# Patient Record
Sex: FEMALE | Race: WHITE | NOT HISPANIC OR LATINO | Employment: OTHER | URBAN - METROPOLITAN AREA
[De-identification: names, ages, dates, MRNs, and addresses within clinical notes are randomized per-mention and may not be internally consistent; named-entity substitution may affect disease eponyms.]

---

## 2021-01-15 DIAGNOSIS — Z23 NEED FOR VACCINATION: ICD-10-CM

## 2021-01-22 ENCOUNTER — IMMUNIZATION (OUTPATIENT)
Dept: FAMILY PLANNING/WOMEN'S HEALTH CLINIC | Facility: IMMUNIZATION CENTER | Age: 76
End: 2021-01-22
Attending: INTERNAL MEDICINE
Payer: MEDICARE

## 2021-01-22 DIAGNOSIS — Z23 NEED FOR VACCINATION: ICD-10-CM

## 2021-01-22 DIAGNOSIS — Z23 ENCOUNTER FOR VACCINATION: Primary | ICD-10-CM

## 2021-01-23 PROCEDURE — 0001A PFIZER SARS-COV-2 VACCINE: CPT

## 2021-01-23 PROCEDURE — 91300 PFIZER SARS-COV-2 VACCINE: CPT

## 2021-02-13 ENCOUNTER — IMMUNIZATION (OUTPATIENT)
Dept: FAMILY PLANNING/WOMEN'S HEALTH CLINIC | Facility: IMMUNIZATION CENTER | Age: 76
End: 2021-02-13
Attending: INTERNAL MEDICINE
Payer: MEDICARE

## 2021-02-13 DIAGNOSIS — Z23 ENCOUNTER FOR VACCINATION: Primary | ICD-10-CM

## 2021-02-13 PROCEDURE — 0002A PFIZER SARS-COV-2 VACCINE: CPT | Performed by: INTERNAL MEDICINE

## 2021-02-13 PROCEDURE — 91300 PFIZER SARS-COV-2 VACCINE: CPT | Performed by: INTERNAL MEDICINE

## 2023-06-15 ENCOUNTER — APPOINTMENT (OUTPATIENT)
Dept: ADMISSIONS | Facility: MEDICAL CENTER | Age: 78
End: 2023-06-15
Attending: INTERNAL MEDICINE
Payer: MEDICARE

## 2023-07-07 ENCOUNTER — PRE-ADMISSION TESTING (OUTPATIENT)
Dept: ADMISSIONS | Facility: MEDICAL CENTER | Age: 78
End: 2023-07-07
Attending: INTERNAL MEDICINE
Payer: MEDICARE

## 2023-07-07 DIAGNOSIS — Z01.812 PRE-OPERATIVE LABORATORY EXAMINATION: ICD-10-CM

## 2023-07-07 DIAGNOSIS — Z01.810 PRE-OPERATIVE CARDIOVASCULAR EXAMINATION: ICD-10-CM

## 2023-07-07 RX ORDER — LEVOTHYROXINE SODIUM 0.07 MG/1
75 TABLET ORAL
COMMUNITY

## 2023-07-07 RX ORDER — CHLORTHALIDONE 25 MG/1
25 TABLET ORAL EVERY MORNING
COMMUNITY

## 2023-07-07 RX ORDER — IBUPROFEN 200 MG
200 TABLET ORAL EVERY 6 HOURS PRN
COMMUNITY

## 2023-07-07 RX ORDER — ESTRADIOL 10 UG/1
INSERT VAGINAL
COMMUNITY

## 2023-07-07 NOTE — PREPROCEDURE INSTRUCTIONS
"Preadmit appointment: \" Preparing for your Procedure information\" sheet reviewed with patient with verbal and written instructions- emailed. Patient instructed to continue prescribed medications through the day before surgery, instructed to take the following medications the day of surgery per anesthesia protocol: Levothyroxine, Atenolol  Patient instructed to call his doctor doing procedure/ surgery if any illness develops prior to surgery/procedure. METS >4. Pt reports history of PONV, advised to discuss with anesthesia day of procedure.  Pt reports she has VonWillebrands and it was discussed with her doctor that manages it and with Dr. Servin and he wrote orders for FFP prior to procedure and pt states she was instructed to come in at 7am for 11am procedure, advised pt to follow Dr. Servin instructions.  "

## 2023-07-20 ENCOUNTER — PRE-ADMISSION TESTING (OUTPATIENT)
Dept: ADMISSIONS | Facility: MEDICAL CENTER | Age: 78
End: 2023-07-20
Attending: INTERNAL MEDICINE
Payer: MEDICARE

## 2023-07-20 DIAGNOSIS — Z01.810 PRE-OPERATIVE CARDIOVASCULAR EXAMINATION: ICD-10-CM

## 2023-07-20 DIAGNOSIS — Z01.812 PRE-OPERATIVE LABORATORY EXAMINATION: ICD-10-CM

## 2023-07-20 LAB
ANION GAP SERPL CALC-SCNC: 9 MMOL/L (ref 7–16)
BUN SERPL-MCNC: 23 MG/DL (ref 8–22)
CALCIUM SERPL-MCNC: 10.3 MG/DL (ref 8.4–10.2)
CHLORIDE SERPL-SCNC: 100 MMOL/L (ref 96–112)
CO2 SERPL-SCNC: 27 MMOL/L (ref 20–33)
CREAT SERPL-MCNC: 0.78 MG/DL (ref 0.5–1.4)
EKG IMPRESSION: NORMAL
ERYTHROCYTE [DISTWIDTH] IN BLOOD BY AUTOMATED COUNT: 43.6 FL (ref 35.9–50)
EST. AVERAGE GLUCOSE BLD GHB EST-MCNC: 166 MG/DL
GFR SERPLBLD CREATININE-BSD FMLA CKD-EPI: 79 ML/MIN/1.73 M 2
GLUCOSE SERPL-MCNC: 157 MG/DL (ref 65–99)
HBA1C MFR BLD: 7.4 % (ref 4–5.6)
HCT VFR BLD AUTO: 47.5 % (ref 37–47)
HGB BLD-MCNC: 16 G/DL (ref 12–16)
MCH RBC QN AUTO: 32 PG (ref 27–33)
MCHC RBC AUTO-ENTMCNC: 33.7 G/DL (ref 32.2–35.5)
MCV RBC AUTO: 95 FL (ref 81.4–97.8)
PLATELET # BLD AUTO: 216 K/UL (ref 164–446)
PMV BLD AUTO: 11 FL (ref 9–12.9)
POTASSIUM SERPL-SCNC: 4.1 MMOL/L (ref 3.6–5.5)
RBC # BLD AUTO: 5 M/UL (ref 4.2–5.4)
SODIUM SERPL-SCNC: 136 MMOL/L (ref 135–145)
WBC # BLD AUTO: 7 K/UL (ref 4.8–10.8)

## 2023-07-20 PROCEDURE — 83036 HEMOGLOBIN GLYCOSYLATED A1C: CPT

## 2023-07-20 PROCEDURE — 80048 BASIC METABOLIC PNL TOTAL CA: CPT

## 2023-07-20 PROCEDURE — 93005 ELECTROCARDIOGRAM TRACING: CPT

## 2023-07-20 PROCEDURE — 36415 COLL VENOUS BLD VENIPUNCTURE: CPT

## 2023-07-20 PROCEDURE — 85027 COMPLETE CBC AUTOMATED: CPT

## 2023-07-20 PROCEDURE — 93010 ELECTROCARDIOGRAM REPORT: CPT | Performed by: INTERNAL MEDICINE

## 2023-08-02 ENCOUNTER — HOSPITAL ENCOUNTER (OUTPATIENT)
Facility: MEDICAL CENTER | Age: 78
End: 2023-08-02
Attending: INTERNAL MEDICINE | Admitting: INTERNAL MEDICINE
Payer: MEDICARE

## 2023-08-02 ENCOUNTER — ANESTHESIA EVENT (OUTPATIENT)
Dept: SURGERY | Facility: MEDICAL CENTER | Age: 78
End: 2023-08-02
Payer: MEDICARE

## 2023-08-02 ENCOUNTER — ANESTHESIA (OUTPATIENT)
Dept: SURGERY | Facility: MEDICAL CENTER | Age: 78
End: 2023-08-02
Payer: MEDICARE

## 2023-08-02 VITALS
HEART RATE: 64 BPM | DIASTOLIC BLOOD PRESSURE: 68 MMHG | WEIGHT: 200.62 LBS | HEIGHT: 65 IN | OXYGEN SATURATION: 93 % | RESPIRATION RATE: 16 BRPM | BODY MASS INDEX: 33.43 KG/M2 | SYSTOLIC BLOOD PRESSURE: 142 MMHG | TEMPERATURE: 98.2 F

## 2023-08-02 LAB
ABO GROUP BLD: NORMAL
BARCODED ABORH UBTYP: 5100
BARCODED ABORH UBTYP: 9500
BARCODED PRD CODE UBPRD: NORMAL
BARCODED PRD CODE UBPRD: NORMAL
BARCODED UNIT NUM UBUNT: NORMAL
BARCODED UNIT NUM UBUNT: NORMAL
BLD GP AB SCN SERPL QL: NORMAL
COMPONENT FT 8504FT: NORMAL
COMPONENT FT 8504FT: NORMAL
GLUCOSE BLD STRIP.AUTO-MCNC: 138 MG/DL (ref 65–99)
PRODUCT TYPE UPROD: NORMAL
PRODUCT TYPE UPROD: NORMAL
RH BLD: NORMAL
UNIT STATUS USTAT: NORMAL
UNIT STATUS USTAT: NORMAL

## 2023-08-02 PROCEDURE — 160208 HCHG ENDO MINUTES - EA ADDL 1 MIN LEVEL 4: Performed by: INTERNAL MEDICINE

## 2023-08-02 PROCEDURE — 700111 HCHG RX REV CODE 636 W/ 250 OVERRIDE (IP): Mod: JZ | Performed by: INTERNAL MEDICINE

## 2023-08-02 PROCEDURE — 88305 TISSUE EXAM BY PATHOLOGIST: CPT | Mod: 59

## 2023-08-02 PROCEDURE — 36415 COLL VENOUS BLD VENIPUNCTURE: CPT

## 2023-08-02 PROCEDURE — 86901 BLOOD TYPING SEROLOGIC RH(D): CPT

## 2023-08-02 PROCEDURE — 160025 RECOVERY II MINUTES (STATS): Performed by: INTERNAL MEDICINE

## 2023-08-02 PROCEDURE — 700101 HCHG RX REV CODE 250: Performed by: ANESTHESIOLOGY

## 2023-08-02 PROCEDURE — 160048 HCHG OR STATISTICAL LEVEL 1-5: Performed by: INTERNAL MEDICINE

## 2023-08-02 PROCEDURE — 700105 HCHG RX REV CODE 258: Mod: JZ | Performed by: INTERNAL MEDICINE

## 2023-08-02 PROCEDURE — 700111 HCHG RX REV CODE 636 W/ 250 OVERRIDE (IP): Performed by: ANESTHESIOLOGY

## 2023-08-02 PROCEDURE — 160036 HCHG PACU - EA ADDL 30 MINS PHASE I: Performed by: INTERNAL MEDICINE

## 2023-08-02 PROCEDURE — 88312 SPECIAL STAINS GROUP 1: CPT

## 2023-08-02 PROCEDURE — 160046 HCHG PACU - 1ST 60 MINS PHASE II: Performed by: INTERNAL MEDICINE

## 2023-08-02 PROCEDURE — 160035 HCHG PACU - 1ST 60 MINS PHASE I: Performed by: INTERNAL MEDICINE

## 2023-08-02 PROCEDURE — 86850 RBC ANTIBODY SCREEN: CPT

## 2023-08-02 PROCEDURE — 160002 HCHG RECOVERY MINUTES (STAT): Performed by: INTERNAL MEDICINE

## 2023-08-02 PROCEDURE — 82962 GLUCOSE BLOOD TEST: CPT

## 2023-08-02 PROCEDURE — 36430 TRANSFUSION BLD/BLD COMPNT: CPT | Mod: XU

## 2023-08-02 PROCEDURE — P9017 PLASMA 1 DONOR FRZ W/IN 8 HR: HCPCS | Mod: 91

## 2023-08-02 PROCEDURE — 160009 HCHG ANES TIME/MIN: Performed by: INTERNAL MEDICINE

## 2023-08-02 PROCEDURE — 160203 HCHG ENDO MINUTES - 1ST 30 MINS LEVEL 4: Performed by: INTERNAL MEDICINE

## 2023-08-02 PROCEDURE — 86900 BLOOD TYPING SEROLOGIC ABO: CPT

## 2023-08-02 RX ORDER — SODIUM CHLORIDE, SODIUM LACTATE, POTASSIUM CHLORIDE, CALCIUM CHLORIDE 600; 310; 30; 20 MG/100ML; MG/100ML; MG/100ML; MG/100ML
INJECTION, SOLUTION INTRAVENOUS CONTINUOUS
Status: ACTIVE | OUTPATIENT
Start: 2023-08-02 | End: 2023-08-02

## 2023-08-02 RX ORDER — LIDOCAINE HYDROCHLORIDE 20 MG/ML
INJECTION, SOLUTION EPIDURAL; INFILTRATION; INTRACAUDAL; PERINEURAL PRN
Status: DISCONTINUED | OUTPATIENT
Start: 2023-08-02 | End: 2023-08-02 | Stop reason: SURG

## 2023-08-02 RX ORDER — HALOPERIDOL 5 MG/ML
1 INJECTION INTRAMUSCULAR
Status: DISCONTINUED | OUTPATIENT
Start: 2023-08-02 | End: 2023-08-02 | Stop reason: HOSPADM

## 2023-08-02 RX ORDER — ROCURONIUM BROMIDE 10 MG/ML
INJECTION, SOLUTION INTRAVENOUS PRN
Status: DISCONTINUED | OUTPATIENT
Start: 2023-08-02 | End: 2023-08-02 | Stop reason: SURG

## 2023-08-02 RX ORDER — MEPERIDINE HYDROCHLORIDE 25 MG/ML
12.5 INJECTION INTRAMUSCULAR; INTRAVENOUS; SUBCUTANEOUS
Status: DISCONTINUED | OUTPATIENT
Start: 2023-08-02 | End: 2023-08-02 | Stop reason: HOSPADM

## 2023-08-02 RX ORDER — ONDANSETRON 2 MG/ML
INJECTION INTRAMUSCULAR; INTRAVENOUS PRN
Status: DISCONTINUED | OUTPATIENT
Start: 2023-08-02 | End: 2023-08-02 | Stop reason: SURG

## 2023-08-02 RX ORDER — OXYCODONE HCL 5 MG/5 ML
5 SOLUTION, ORAL ORAL
Status: DISCONTINUED | OUTPATIENT
Start: 2023-08-02 | End: 2023-08-02 | Stop reason: HOSPADM

## 2023-08-02 RX ORDER — ONDANSETRON 2 MG/ML
4 INJECTION INTRAMUSCULAR; INTRAVENOUS
Status: DISCONTINUED | OUTPATIENT
Start: 2023-08-02 | End: 2023-08-02 | Stop reason: HOSPADM

## 2023-08-02 RX ORDER — LIDOCAINE HYDROCHLORIDE 40 MG/ML
SOLUTION TOPICAL PRN
Status: DISCONTINUED | OUTPATIENT
Start: 2023-08-02 | End: 2023-08-02 | Stop reason: SURG

## 2023-08-02 RX ORDER — DIPHENHYDRAMINE HYDROCHLORIDE 50 MG/ML
25 INJECTION INTRAMUSCULAR; INTRAVENOUS
Status: COMPLETED | OUTPATIENT
Start: 2023-08-02 | End: 2023-08-02

## 2023-08-02 RX ORDER — GLYCOPYRROLATE 0.2 MG/ML
INJECTION INTRAMUSCULAR; INTRAVENOUS PRN
Status: DISCONTINUED | OUTPATIENT
Start: 2023-08-02 | End: 2023-08-02 | Stop reason: SURG

## 2023-08-02 RX ORDER — OXYCODONE HCL 5 MG/5 ML
10 SOLUTION, ORAL ORAL
Status: DISCONTINUED | OUTPATIENT
Start: 2023-08-02 | End: 2023-08-02 | Stop reason: HOSPADM

## 2023-08-02 RX ORDER — DIPHENHYDRAMINE HYDROCHLORIDE 50 MG/ML
12.5 INJECTION INTRAMUSCULAR; INTRAVENOUS
Status: DISCONTINUED | OUTPATIENT
Start: 2023-08-02 | End: 2023-08-02 | Stop reason: HOSPADM

## 2023-08-02 RX ORDER — IPRATROPIUM BROMIDE AND ALBUTEROL SULFATE 2.5; .5 MG/3ML; MG/3ML
3 SOLUTION RESPIRATORY (INHALATION)
Status: DISCONTINUED | OUTPATIENT
Start: 2023-08-02 | End: 2023-08-02 | Stop reason: HOSPADM

## 2023-08-02 RX ORDER — HYDROMORPHONE HYDROCHLORIDE 1 MG/ML
0.4 INJECTION, SOLUTION INTRAMUSCULAR; INTRAVENOUS; SUBCUTANEOUS
Status: DISCONTINUED | OUTPATIENT
Start: 2023-08-02 | End: 2023-08-02 | Stop reason: HOSPADM

## 2023-08-02 RX ORDER — MIDAZOLAM HYDROCHLORIDE 1 MG/ML
1 INJECTION INTRAMUSCULAR; INTRAVENOUS
Status: DISCONTINUED | OUTPATIENT
Start: 2023-08-02 | End: 2023-08-02 | Stop reason: HOSPADM

## 2023-08-02 RX ORDER — SODIUM CHLORIDE, SODIUM GLUCONATE, SODIUM ACETATE, POTASSIUM CHLORIDE AND MAGNESIUM CHLORIDE 526; 502; 368; 37; 30 MG/100ML; MG/100ML; MG/100ML; MG/100ML; MG/100ML
500 INJECTION, SOLUTION INTRAVENOUS CONTINUOUS
Status: DISCONTINUED | OUTPATIENT
Start: 2023-08-02 | End: 2023-08-02 | Stop reason: HOSPADM

## 2023-08-02 RX ORDER — DEXAMETHASONE SODIUM PHOSPHATE 4 MG/ML
INJECTION, SOLUTION INTRA-ARTICULAR; INTRALESIONAL; INTRAMUSCULAR; INTRAVENOUS; SOFT TISSUE PRN
Status: DISCONTINUED | OUTPATIENT
Start: 2023-08-02 | End: 2023-08-02 | Stop reason: SURG

## 2023-08-02 RX ORDER — HYDROMORPHONE HYDROCHLORIDE 1 MG/ML
0.2 INJECTION, SOLUTION INTRAMUSCULAR; INTRAVENOUS; SUBCUTANEOUS
Status: DISCONTINUED | OUTPATIENT
Start: 2023-08-02 | End: 2023-08-02 | Stop reason: HOSPADM

## 2023-08-02 RX ORDER — HYDROMORPHONE HYDROCHLORIDE 1 MG/ML
1 INJECTION, SOLUTION INTRAMUSCULAR; INTRAVENOUS; SUBCUTANEOUS
Status: DISCONTINUED | OUTPATIENT
Start: 2023-08-02 | End: 2023-08-02 | Stop reason: HOSPADM

## 2023-08-02 RX ADMIN — GLYCOPYRROLATE 0.2 MG: 0.2 INJECTION INTRAMUSCULAR; INTRAVENOUS at 12:38

## 2023-08-02 RX ADMIN — ONDANSETRON 4 MG: 2 INJECTION INTRAMUSCULAR; INTRAVENOUS at 12:24

## 2023-08-02 RX ADMIN — LIDOCAINE HYDROCHLORIDE 4 ML: 40 SOLUTION TOPICAL at 11:49

## 2023-08-02 RX ADMIN — PROPOFOL 140 MG: 10 INJECTION, EMULSION INTRAVENOUS at 11:49

## 2023-08-02 RX ADMIN — DIPHENHYDRAMINE HYDROCHLORIDE 25 MG: 50 INJECTION INTRAMUSCULAR; INTRAVENOUS at 10:20

## 2023-08-02 RX ADMIN — SODIUM CHLORIDE, POTASSIUM CHLORIDE, SODIUM LACTATE AND CALCIUM CHLORIDE: 600; 310; 30; 20 INJECTION, SOLUTION INTRAVENOUS at 10:56

## 2023-08-02 RX ADMIN — SUGAMMADEX 180 MG: 100 INJECTION, SOLUTION INTRAVENOUS at 12:52

## 2023-08-02 RX ADMIN — ROCURONIUM BROMIDE 80 MG: 50 INJECTION, SOLUTION INTRAVENOUS at 11:49

## 2023-08-02 RX ADMIN — LIDOCAINE HYDROCHLORIDE 40 MG: 20 INJECTION, SOLUTION EPIDURAL; INFILTRATION; INTRACAUDAL at 11:49

## 2023-08-02 RX ADMIN — DEXAMETHASONE SODIUM PHOSPHATE 8 MG: 4 INJECTION INTRA-ARTICULAR; INTRALESIONAL; INTRAMUSCULAR; INTRAVENOUS; SOFT TISSUE at 11:53

## 2023-08-02 ASSESSMENT — ENCOUNTER SYMPTOMS
SHORTNESS OF BREATH: 0
COUGH: 0
ABDOMINAL PAIN: 0
VOMITING: 0
BLOOD IN STOOL: 0

## 2023-08-02 ASSESSMENT — PAIN DESCRIPTION - PAIN TYPE: TYPE: ACUTE PAIN

## 2023-08-02 NOTE — OR NURSING
1258 To PACU from [facility] by racquel kerr; pt arouses spontaneously, denies any pain or discomfort.     1310 pt given sips of water, tolerating well.     1315 pt care transferred to LIZET Guzman    oriented to person, place and time , normal sensation , short and long term memory intact

## 2023-08-02 NOTE — ANESTHESIA PROCEDURE NOTES
Airway    Date/Time: 8/2/2023 11:49 AM    Performed by: Arthur Zambrano III, M.D.  Authorized by: Arthur Zambrano III, M.D.    Location:  OR  Urgency:  Elective  Difficult Airway: No    Indications for Airway Management:  Anesthesia      Spontaneous Ventilation: absent    Sedation Level:  Deep  Preoxygenated: Yes    Patient Position:  Sniffing  Final Airway Type:  Endotracheal airway  Final Endotracheal Airway:  ETT  Cuffed: Yes    Technique Used for Successful ETT Placement:  Direct laryngoscopy    Insertion Site:  Oral  Blade Type:  Carr  Laryngoscope Blade/Videolaryngoscope Blade Size:  2  ETT Size (mm):  7.5  Measured from:  Lips  ETT to Lips (cm):  22  Placement Verified by: auscultation and capnometry    Cormack-Lehane Classification:  Grade III - view of epiglottis only  Number of Attempts at Approach:  1

## 2023-08-02 NOTE — ANESTHESIA TIME REPORT
Anesthesia Start and Stop Event Times     Date Time Event    8/2/2023 1133 Ready for Procedure     1144 Anesthesia Start     1300 Anesthesia Stop        Responsible Staff  08/02/23    Name Role Begin End    Arthur Zambrano III, M.D. Anesth 1144 1212    Rome Kay M.D. Anesth 1212 1300        Overtime Reason:  no overtime (within assigned shift)    Comments:

## 2023-08-02 NOTE — OP REPORT
EGD and COLONOSCOPY PROCEDURE NOTE:    John Servin M.D.  Date & Time note created:    8/2/2023   1:14 PM       Patient ID:  Name:             Cathy Cerna   YOB: 1945  Age:                 77 y.o.  female   MRN:               [id number]     PROCEDURE: EGD with snare polypectomy and biopsy    COLONOSCOPY with snare polypectomy and control of bleeding    SURGEON: Jonh Servin M.D.     PREPROCEDURE DIAGNOSIS: Heartburn.  Epigastric pain.  Colon polyp.    POSTPROCEDURE DIAGNOSIS: Same      Surgeon(s):  John Servin M.D.    Anesthesiologist/Type of Anesthesia:  Anesthesiologist: Arthur Zambrano III, M.D.; Rome Kay M.D./General    Surgical Staff:  Circulator: Narinder Ramos R.N.  Endoscopy Technician: Corina Amanda C.N.A.; Deana Starkey    Specimens removed if any:  ID Type Source Tests Collected by Time Destination   A : celiac Tissue Duodenum PATHOLOGY SPECIMEN John Servin M.D. 8/2/2023 11:58 AM    B : prepyloric bx Tissue Gastric PATHOLOGY SPECIMEN John Servin M.D. 8/2/2023 12:03 PM    C : cardia bx Tissue Gastric PATHOLOGY SPECIMEN John Servin M.D. 8/2/2023 12:05 PM    D : polyp bx Tissue Gastric PATHOLOGY SPECIMEN John Servin M.D. 8/2/2023 12:09 PM    E : mid transverse polyp bx Tissue Colon - Transverse PATHOLOGY SPECIMEN John Servin M.D. 8/2/2023 12:26 PM    F : polyp bx Tissue Cecum PATHOLOGY SPECIMEN John Servin M.D. 8/2/2023 12:35 PM    G : hepatic flexure polyp bx Tissue Colon PATHOLOGY SPECIMEN John Servin M.D. 8/2/2023 12:38 PM    H :  sigmoid colon polyp bx Tissue Colon PATHOLOGY SPECIMEN John Servin M.D. 8/2/2023 12:45 PM          FINDINGS   Grade A erosive esophagitis  Irregular gastric cardia mucosa  Removed 2 gastric polyps  8 mm and 15 mm areas of thick irregular prepyloric mucosa of unclear significance  Status post colonic polypectomies  Bleeding from cecal polypectomy site  treated with an endoscopic clip.    SPECIMENS   A.  Duodenal biopsies for celiac disease  B.  Biopsy of an 8 mm and a 15 mm areas of thick prepyloric mucosa  C.  Biopsies of irregular gastric cardia mucosa  D.  Two 6 mm gastric body polyps  E.  4 mm mid transverse colon polyp  F.  6 mm cecal polyp  G.  4 mm hepatic flexure polyp  H.  4 mm sigmoid polyp at 18 cm    COMPLICATIONS: None       PLAN   Await biopsy results  Start pantoprazole 40 mg daily.      CONSENT   The procedure was explained to the patient, including the indications, risks, benefits, alternatives and method of performing the procedure. Questions about the procedure were solicited and answered to the patient’s satisfaction, who appeared to understand and agreed to proceed with it.     EGD PROCEDURE   After signed informed consent, and induction of general anesthesia, the patient was placed in the left lateral decubitus position.  The Olympus flexible videoendoscope was introduced into the oropharynx and advanced through the esophagus and stomach and into the descending duodenum without difficulty.  On withdrawal of the endoscope, the second portion of the duodenum and the bulb of the duodenum were normal.  The pylorus was normal.  Descending duodenal biopsies were taken for celiac disease.  The gastric mucosa was notable for an 8 mm and a 15 mm areas of thick prepyloric mucosa of unclear significance, and biopsies were taken of each of these areas.  In addition, there were two 6 mm gastric polyps in the proximal body.  One of them was removed with cold snare polypectomy and the second was removed with hot snare polypectomy.  Both polyps were retrieved in a suction trap.  The gastroesophageal junction on forward and retroflexed view demonstrated irregular gastric cardia mucosa of unclear significance involving about a 1 cm length of the stomach.  Multiple biopsies were taken of this area.  In addition, there was Grade A erosive esophagitis.  The  remainder of the esophagus was normal.  The patient tolerated the procedure well and there were no immediate complications.    COLONOSCOPY PROCEDURE   The patient was repositioned, and digital rectal examination was unremarkable.  The Olympus pediatric flexible videocolonoscope was introduced into the rectum and advanced through the colon to the cecum with moderate difficulty.  The cecum was confirmed by locating the appendiceal orifice and the ileocecal valve.  Retained stool was removed with irrigation and suctioning.  The above colon polyps were removed with cold snare polypectomy and retrieved in a suction trap.  There was persistent oozing from the 6 mm cecal polyp, which was treated with an endoscopic clip, with cessation of bleeding.  The anus was normal on forward and retroflexed view.  The patient tolerated the procedure well and there were no immediate complications.  She was transported to the recovery area in satisfactory condition.      John Servin M.D.

## 2023-08-02 NOTE — OR NURSING
1315- Report received from LIZET Mercer. Patient awake. Respirations spontaneous and unlabored. VSS. Abdomen soft. Denies any pain or nausea. PO fluids provided.   1335- Dr. Servin at bedside, discussing procedure and findings.   1350- O2 saturation on RA 88-92%. IS and education provided. Patient performs correctly with direction, pulling 1500 mL.   1420- Continues to deny any pain or nausea. VSS. Meets criteria for Stage 2.

## 2023-08-02 NOTE — DISCHARGE INSTRUCTIONS
ENDOSCOPY HOME CARE INSTRUCTIONS    GASTROSCOPY OR ERCP  1. Don't eat or drink anything for about an hour after the test. You can then resume your regular diet.  2. Don't drive or drink alcohol for 24 hours. The medication you received will make you too drowsy.  3. Don't take any coffee, tea, or aspirin products until after you see your doctor. These can harm the lining of your stomach.  4. If you begin to vomit bloody material, or develop black or bloody stools, call your doctor as soon as possible.  5. If you have any neck, chest, abdominal pain or temp of 100 degrees, call your doctor.  6. See your doctor Please call to schedule a follow up appointment. Letter with final pathology results will be mailed to you.   7. Prescriptions: pantoprazole 40 mg daily, sent electronically to pharmacy.     COLONOSCOPY OR FLEXIBLE SIGMOIDOSCOPY  1. If you received a barium enema, take a mild laxative such as dulcolax, Meredith's M.O., or Milk of Magnesia to clean out the barium.  2. Drink plenty of fluids. Eat a diet high in fiber (whole-grain breads, fresh fruit and vegetables).  3. You may notice a few drops of blood with your first bowel movement. If you develop any large amount of bleeding, black stools, a fever, or abdominal pain, call your doctor right away.  4. Call your doctor for test results in 14 day(s). Letter with final pathology results will be mailed to you.   5. Don't drive or drink alcohol for 24 hours. The medication you received will make you too drowsy.  6. No heavy lifting, no Aspirin products or Aspirin for 5 days     Dr. Servin [phone]      You should call [phone] if you develop problems with breathing or chest pain.  If any questions arise, call your doctor. If your doctor is not available, please feel free to call [phone]. You can also call the [facility] open 24 hours/day, 7 days/week and speak to a nurse at [phone], or toll free [phone].

## 2023-08-02 NOTE — ANESTHESIA POSTPROCEDURE EVALUATION
Patient: Cathy Cerna    Procedure Summary     Date: 08/02/23 Room / Location:  ENDOSCOPIC ULTRASOUND ROOM / SURGERY [facility]    Anesthesia Start: 1144 Anesthesia Stop: 1300    Procedures:       HIGH RISK SCREENING COLONOSCOPY AND ESOPHAGOGASTRODUODENOSCOPY      GASTROSCOPY Diagnosis:       Gastric polyp      (Gastric polyp [[id number]])    Surgeons: John Servin M.D. Responsible Provider: Rome Kay M.D.    Anesthesia Type: general ASA Status: 3          Final Anesthesia Type: general  Last vitals  BP   Blood Pressure : 135/63    Temp   35.8 °C (96.5 °F)    Pulse   60   Resp   15    SpO2   90 %      Anesthesia Post Evaluation    Patient location during evaluation: PACU  Patient participation: complete - patient participated  Level of consciousness: awake and alert    Airway patency: patent  Anesthetic complications: no  Cardiovascular status: hemodynamically stable  Respiratory status: acceptable  Hydration status: euvolemic    PONV: none          No notable events documented.     Nurse Pain Score: 0 (NPRS)

## 2023-08-02 NOTE — H&P
Gastroenterology H&P Note:    John Servin M.D.  Date & Time note created:    8/2/2023   8:07 AM       Patient ID:  Name:             Cathy Cerna   YOB: 1945  Age:                 77 y.o.  female   MRN:               [id number]                                                             Reason for Consult:     Heartburn  Epigastric pain  Colon polyp  Von Willibrand disease    History of Present Illness    Cathy Cerna has heartburn and epigastric pain, and is here for an EGD to evaluate it.    She is also here for surveillance colonoscopy.   Colon polyp history.  [facility]/CP: None.  COL 1999: Removed 1 adenoma.  COL 3/2004: Diverticulosis.  COL 10/2014: Normal.  Recall 5 years.  COL 12/2021: Found a 5 mm cecal polyp.  The polyp was not removed, because she has von Willebrand disease and did not take any premedication.      Assessment     Heartburn and epigastric pain  Evaluate with EGD.    Colon polyp history  She has a residual 5 mm cecal polyp.  Repeat colonoscopy to remove the cecal polyp and rule out metachronous lesions.    Von Willebrand disease  This problem is managed by Dr. Browning.  Hematology clearance from Dr. Browning to have colonoscopy with anesthesia.    On 5/22/2023 office visit at [facility], patient had an order signed by Dr. Browning for 2 units of FFP 1 hour before EGD/COL.      Recommendations     Transfuse 2 units FFP 1 hour before the EGD/colonoscopy.  EGD  Colonoscopy    I discussed the esophagogastroduodenoscopy and colonoscopy with possible biopsy, polypectomy and therapeutics procedure with the patient, including the indications, risks, benefits, alternatives and the method of performing the procedure.  Questions about the procedure were solicited and answered to her satisfaction.  She appeared to understand and agreed to proceed with it.      Problem List  There are no active hospital problems to display for this patient.        Labs:              No results for  input(s): ALTSGPT, ASTSGOT, ALKPHOSPHAT, TBILIRUBIN, DBILIRUBIN, GAMMAGT, AMYLASE, LIPASE, ALB, PREALBUMIN, GLUCOSE in the last 72 hours.    No results found for: BLOODCULTU, BLDCULT, BCHOLD     GI/Nutrition:  No orders of the defined types were placed in this encounter.      Past Medical History:   Past Medical History:   Diagnosis Date    Anesthesia     post op n&v    Arthritis     osteoarthritis    Cancer (HCC) 1987    bilateral breast, mastectomy    CATARACT     bilateral IOL    Disorder of thyroid     hypothyroid    Heart burn     High cholesterol     mild    HTN (hypertension)     Hyperlipemia 03/01/2012    Indigestion     Menopause 03/01/2012    Pneumonia     bacterial and viral 2003    PONV (postoperative nausea and vomiting)     Prediabetes     Rosacea 03/01/2012    Unspecified hemorrhagic conditions     Von Willibrand    Von Willebrand disease ([facility])     Von Willebrand disease ([facility])     Dr. Browning       Past Surgical History:  Past Surgical History:   Procedure Laterality Date    DILATION AND CURETTAGE  09/13/2013    Performed by Nick Iyer M.D. at SURGERY SAME DAY [facility] ORS    HYSTEROSCOPY WITH VIDEO OPERATIVE  09/13/2013    Performed by Nick Iyer M.D. at SURGERY SAME DAY [facility] ORS    APPENDECTOMY LAPAROSCOPIC  11/05/2009    Performed by LOIS GIPSON at SURGERY [facility] ORS    MASTECTOMY Bilateral 1987    bilat mastectomy, now w/ implants    TONSILLECTOMY  1953    BREAST BIOPSY      Open    CATARACT EXTRACTION WITH IOL Bilateral     cataracts       Medication Allergy:  Allergies   Allergen Reactions    Tylenol [Apra] Vomiting    Aleve [Naproxen]      Pt reports causes elevated blood pressure    Tape      Redness/ PAPER TAPE OK       Hospital Medications:        Current Outpatient Medications:  No current facility-administered medications on file prior to encounter.     Current Outpatient Medications on File Prior to Encounter   Medication Sig Dispense Refill    potassium  chloride (MICRO-K) 10 MEQ capsule Take 1 Cap by mouth every day. OVERDUE FOR APPT, PLEASE CALL TO MAKE APPT WITH DR NAVARRETE (Patient taking differently: Take 10 mEq by mouth every morning. OVERDUE FOR APPT, PLEASE CALL TO MAKE APPT WITH DR NAVARRETE) 30 Cap 1    atenolol (TENORMIN) 25 MG TABS Take 25 mg by mouth every morning.      amantadine (SYMMETREL) 100 MG TABS Take 100 mg by mouth every evening.      Multiple Vitamin (MULTI-VITAMIN PO) Take 1 Tab by mouth every day.      enalapril (VASOTEC) 20 MG tablet Take 1 Tab by mouth 2 times a day. 60 Tab 3       Physical Exam:  Vitals/ General Appearance:   Weight/BMI: There is no height or weight on file to calculate BMI.  There were no vitals taken for this visit.  There were no vitals filed for this visit.  Oxygen Therapy:       Physical Exam  Vitals reviewed.   HENT:      Head: Normocephalic and atraumatic.      Mouth/Throat:      Comments: Normal oropharynx.  Eyes:      Pupils: Pupils are equal, round, and reactive to light.   Neck:      Thyroid: No thyromegaly.   Cardiovascular:      Heart sounds: Normal heart sounds. No murmur heard.     No friction rub.   Pulmonary:      Breath sounds: No wheezing or rales.   Abdominal:      Palpations: There is no mass.      Tenderness: There is no abdominal tenderness. There is no guarding.   Musculoskeletal:      Cervical back: Neck supple.   Lymphadenopathy:      Cervical: No cervical adenopathy.   Skin:     Findings: No erythema or rash.   Neurological:      Mental Status: She is alert.      Cranial Nerves: No cranial nerve deficit.   Psychiatric:         Mood and Affect: Affect normal.         Cognition and Memory: Memory normal.         Review of Systems:      Review of Systems   Respiratory:  Negative for cough and shortness of breath.    Cardiovascular:  Negative for chest pain.   Gastrointestinal:  Negative for abdominal pain, blood in stool, melena and vomiting.             Family History:  Family History   Problem Relation  Age of Onset    Other Mother         Von Willebrand's disease    Cancer Mother     Arrythmia Father         AF    Heart Disease Father     Bilateral Breast Cancer Father     Non-contributory Brother         Valvular heart disease    Hyperlipidemia Maternal Uncle     Hyperlipidemia Maternal Grandfather     Breast Cancer Paternal Grandmother     Hypertension Maternal Uncle        Social History:  Social History     Socioeconomic History    Marital status: Single     Spouse name: Not on file    Number of children: Not on file    Years of education: Not on file    Highest education level: Not on file   Occupational History    Not on file   Tobacco Use    Smoking status: Never    Smokeless tobacco: Never   Vaping Use    Vaping Use: Never used   Substance and Sexual Activity    Alcohol use: Yes     Comment: once a year    Drug use: No    Sexual activity: Not on file   Other Topics Concern    Not on file   Social History Narrative    Not on file     Social Determinants of Health     Financial Resource Strain: Not on file   Food Insecurity: Not on file   Transportation Needs: Not on file   Physical Activity: Not on file   Stress: Not on file   Social Connections: Not on file   Intimate Partner Violence: Not on file   Housing Stability: Not on file       MDM (Data Review):     Records reviewed and summarized in current documentation        Thank your for the opportunity to assist in the care of your patient.  Please call for any questions or concerns.    John Servin M.D.

## 2023-08-02 NOTE — OR NURSING
Pt allergies and NPO status verified. Home medications reconciled. Belongings secured. Pt verbalizes understanding of pain scale, expected course of stay and plan of care. Surgical site verified with pt. IV access established.  All questions answered. Bed in low position. Call light within reach. First unit of FFP being administered now    1014: First unit of FFP administered. Pt noted one single hive under chin. Gareth and Juan Manuel notified, 2nd unit of FFP to be given. Benadryl given per MAR.    1048: Second unit of FFP administered. 2 hives noted under chin. No other symptoms felt by pt

## 2023-08-02 NOTE — ANESTHESIA PREPROCEDURE EVALUATION
Case: [id number] Date/Time: 08/02/23 1045    Procedures:       HIGH RISK SCREENING COLONOSCOPY AND ESOPHAGOGASTRODUODENOSCOPY      GASTROSCOPY    Pre-op diagnosis: SURVEILLANCE DUE TO PRIOR COLONIC NEOPLASIA    Location:  ENDOSCOPIC ULTRASOUND ROOM / SURGERY [facility]    Surgeons: John Servin M.D.          Relevant Problems   CARDIAC   (positive) Essential hypertension, benign       Physical Exam    Airway   Mallampati: III  TM distance: >3 FB  Neck ROM: limited       Cardiovascular - normal exam  Rhythm: regular  Rate: normal  (-) murmur     Dental - normal exam           Pulmonary - normal exam  Breath sounds clear to auscultation     Abdominal   (+) obese     Neurological - normal exam                 Anesthesia Plan    ASA 3       Plan - general       Airway plan will be ETT          Induction: intravenous    Postoperative Plan: Postoperative administration of opioids is intended.    Pertinent diagnostic labs and testing reviewed    Informed Consent:    Anesthetic plan and risks discussed with patient.    Use of blood products discussed with: patient whom consented to blood products.

## 2023-08-02 NOTE — OR NURSING
1420 Received report from Megan HINDS. Pt transferred to stage 2, assisted into clothing.     1439 Pt family brought into stage 2.     1450 Pt discharged home with family after an uneventful stay in stage 2. Discharge instructions reviewed, questions answered. PIV removed, tip intact. Pt escorted out in wheelchair by CNA.

## 2023-08-03 LAB — PATHOLOGY CONSULT NOTE: NORMAL

## (undated) DEVICE — MASK WITH FACE SHIELD (25/BX 4BX/CA)

## (undated) DEVICE — KIT CUSTOM PROCEDURE SINGLE FOR ENDO  (15/CA)

## (undated) DEVICE — SYRINGE SAFETY 5 ML 18 GA X 1-1/2 BLUNT LL (100/BX 4BX/CA)

## (undated) DEVICE — CANISTER SUCTION RIGID RED 1500CC (40EA/CA)

## (undated) DEVICE — CUFF BP ADULT LARGE DISPOSABLE (20EA/CA)

## (undated) DEVICE — SYRINGE SAFETY 10 ML 18 GA X 1 1/2 BLUNT LL (100/BX 4BX/CA)

## (undated) DEVICE — LACTATED RINGERS INJ 1000 ML - (14EA/CA 60CA/PF)

## (undated) DEVICE — SENSOR OXIMETER ADULT SPO2 RD SET (20EA/BX)

## (undated) DEVICE — GOWN WARMING STANDARD FLEX - (30/CA)

## (undated) DEVICE — CATHETER IV SAFETY 20 GA X 1-1/4 (50/BX)

## (undated) DEVICE — SET EXTENSION WITH 2 PORTS (48EA/CA) ***PART #2C8610 IS A SUBSTITUTE*****

## (undated) DEVICE — GOWN SURGEONS LARGE - (32/CA)

## (undated) DEVICE — SPONGE GAUZE NON-STERILE 4X4 - (2000/CA 10PK/CA)

## (undated) DEVICE — DEVICE HEMOSTATIC CLIPPING RESOLUTION 360 DEGREES (20EA/BX)

## (undated) DEVICE — WATER IRRIGATION STERILE 1000ML (12EA/CA)

## (undated) DEVICE — MASK O2 VNYL ADLT RBRTH HI - (50/CS)

## (undated) DEVICE — KIT  I.V. START (100EA/CA)

## (undated) DEVICE — SYRINGE SAFETY 3 ML 18 GA X 1 1/2 BLUNT LL (100/BX 8BX/CA)

## (undated) DEVICE — BLOCK BITE ENDOSCOPIC 2809 - (100/BX) INTERMEDIATE

## (undated) DEVICE — TUBE SUCTION YANKAUER  1/4 X 6FT (20EA/CA)"

## (undated) DEVICE — SET LEADWIRE 5 LEAD BEDSIDE DISPOSABLE ECG (1SET OF 5/EA)

## (undated) DEVICE — SYRINGE DISP. 60 CC LL - (30/BX, 12BX/CA)**WHEN THESE ARE GONE ORDER #500206**

## (undated) DEVICE — FORCEP RADIAL JAW 4 STANDARD CAPACITY W/NEEDLE 240CM (40EA/BX)

## (undated) DEVICE — TUBING CLEARLINK DUO-VENT - C-FLO (48EA/CA)

## (undated) DEVICE — PAD PREP 24 X 48 CUFFED - (100/CA)

## (undated) DEVICE — TUBE CONNECTING SUCTION - CLEAR PLASTIC STERILE 72 IN (50EA/CA)

## (undated) DEVICE — CANNULA O2 COMFORT SOFT EAR ADULT 7 FT TUBING (50/CA)

## (undated) DEVICE — SOD. CHL. INJ. 0.9% 1000 ML - (14EA/CA 60CA/PF)